# Patient Record
Sex: FEMALE | Race: BLACK OR AFRICAN AMERICAN | Employment: FULL TIME | URBAN - METROPOLITAN AREA
[De-identification: names, ages, dates, MRNs, and addresses within clinical notes are randomized per-mention and may not be internally consistent; named-entity substitution may affect disease eponyms.]

---

## 2023-05-01 ENCOUNTER — APPOINTMENT (OUTPATIENT)
Dept: ULTRASOUND IMAGING | Age: 33
End: 2023-05-01
Attending: EMERGENCY MEDICINE
Payer: COMMERCIAL

## 2023-05-01 ENCOUNTER — HOSPITAL ENCOUNTER (EMERGENCY)
Age: 33
Discharge: HOME | End: 2023-05-02
Attending: EMERGENCY MEDICINE
Payer: COMMERCIAL

## 2023-05-01 VITALS
DIASTOLIC BLOOD PRESSURE: 67 MMHG | SYSTOLIC BLOOD PRESSURE: 102 MMHG | WEIGHT: 189 LBS | HEIGHT: 63 IN | HEART RATE: 83 BPM | RESPIRATION RATE: 18 BRPM | OXYGEN SATURATION: 100 % | TEMPERATURE: 98.3 F | BODY MASS INDEX: 33.49 KG/M2

## 2023-05-01 DIAGNOSIS — O26.891 ABDOMINAL PAIN DURING PREGNANCY IN FIRST TRIMESTER: Primary | ICD-10-CM

## 2023-05-01 DIAGNOSIS — R10.9 ABDOMINAL PAIN DURING PREGNANCY IN FIRST TRIMESTER: Primary | ICD-10-CM

## 2023-05-01 LAB
ANION GAP SERPL CALC-SCNC: 3 MMOL/L (ref 5–15)
APPEARANCE UR: CLEAR
BACTERIA URNS QL MICRO: NEGATIVE /HPF
BASOPHILS # BLD: 0 K/UL (ref 0–0.1)
BASOPHILS NFR BLD: 0 % (ref 0–1)
BILIRUB UR QL: NEGATIVE
BUN SERPL-MCNC: 11 MG/DL (ref 6–20)
BUN/CREAT SERPL: 15 (ref 12–20)
CALCIUM SERPL-MCNC: 8.7 MG/DL (ref 8.5–10.1)
CHLORIDE SERPL-SCNC: 105 MMOL/L (ref 97–108)
CO2 SERPL-SCNC: 24 MMOL/L (ref 21–32)
COLOR UR: ABNORMAL
CREAT SERPL-MCNC: 0.71 MG/DL (ref 0.55–1.02)
DIFFERENTIAL METHOD BLD: ABNORMAL
EOSINOPHIL # BLD: 0 K/UL (ref 0–0.4)
EOSINOPHIL NFR BLD: 0 % (ref 0–7)
EPITH CASTS URNS QL MICRO: ABNORMAL /LPF
ERYTHROCYTE [DISTWIDTH] IN BLOOD BY AUTOMATED COUNT: 13.5 % (ref 11.5–14.5)
GLUCOSE SERPL-MCNC: 125 MG/DL (ref 65–100)
GLUCOSE UR STRIP.AUTO-MCNC: NEGATIVE MG/DL
HCG SERPL-ACNC: 424 MIU/ML (ref 0–6)
HCT VFR BLD AUTO: 35.1 % (ref 35–47)
HGB BLD-MCNC: 11.5 G/DL (ref 11.5–16)
HGB UR QL STRIP: ABNORMAL
IMM GRANULOCYTES # BLD AUTO: 0 K/UL (ref 0–0.04)
IMM GRANULOCYTES NFR BLD AUTO: 0 % (ref 0–0.5)
KETONES UR QL STRIP.AUTO: 15 MG/DL
LEUKOCYTE ESTERASE UR QL STRIP.AUTO: NEGATIVE
LYMPHOCYTES # BLD: 1.5 K/UL (ref 0.8–3.5)
LYMPHOCYTES NFR BLD: 14 % (ref 12–49)
MCH RBC QN AUTO: 30.7 PG (ref 26–34)
MCHC RBC AUTO-ENTMCNC: 32.8 G/DL (ref 30–36.5)
MCV RBC AUTO: 93.9 FL (ref 80–99)
MONOCYTES # BLD: 0.4 K/UL (ref 0–1)
MONOCYTES NFR BLD: 4 % (ref 5–13)
NEUTS SEG # BLD: 8.7 K/UL (ref 1.8–8)
NEUTS SEG NFR BLD: 82 % (ref 32–75)
NITRITE UR QL STRIP.AUTO: NEGATIVE
NRBC # BLD: 0 K/UL (ref 0–0.01)
NRBC BLD-RTO: 0 PER 100 WBC
PH UR STRIP: 5 (ref 5–8)
PLATELET # BLD AUTO: 294 K/UL (ref 150–400)
PMV BLD AUTO: 9.6 FL (ref 8.9–12.9)
POTASSIUM SERPL-SCNC: 4.2 MMOL/L (ref 3.5–5.1)
PROT UR STRIP-MCNC: NEGATIVE MG/DL
RBC # BLD AUTO: 3.74 M/UL (ref 3.8–5.2)
RBC #/AREA URNS HPF: ABNORMAL /HPF (ref 0–5)
SODIUM SERPL-SCNC: 132 MMOL/L (ref 136–145)
SP GR UR REFRACTOMETRY: 1.03 (ref 1–1.03)
UR CULT HOLD, URHOLD: NORMAL
UROBILINOGEN UR QL STRIP.AUTO: 0.2 EU/DL (ref 0.2–1)
WBC # BLD AUTO: 10.7 K/UL (ref 3.6–11)
WBC URNS QL MICRO: ABNORMAL /HPF (ref 0–4)

## 2023-05-01 PROCEDURE — 76817 TRANSVAGINAL US OBSTETRIC: CPT

## 2023-05-01 PROCEDURE — 74011250636 HC RX REV CODE- 250/636: Performed by: EMERGENCY MEDICINE

## 2023-05-01 PROCEDURE — 81001 URINALYSIS AUTO W/SCOPE: CPT

## 2023-05-01 PROCEDURE — 80048 BASIC METABOLIC PNL TOTAL CA: CPT

## 2023-05-01 PROCEDURE — 74011250637 HC RX REV CODE- 250/637: Performed by: EMERGENCY MEDICINE

## 2023-05-01 PROCEDURE — 36415 COLL VENOUS BLD VENIPUNCTURE: CPT

## 2023-05-01 PROCEDURE — 99284 EMERGENCY DEPT VISIT MOD MDM: CPT

## 2023-05-01 PROCEDURE — 86900 BLOOD TYPING SEROLOGIC ABO: CPT

## 2023-05-01 PROCEDURE — 84702 CHORIONIC GONADOTROPIN TEST: CPT

## 2023-05-01 PROCEDURE — 85025 COMPLETE CBC W/AUTO DIFF WBC: CPT

## 2023-05-01 RX ORDER — ACETAMINOPHEN 500 MG
1000 TABLET ORAL
Qty: 20 TABLET | Refills: 0 | OUTPATIENT
Start: 2023-05-01

## 2023-05-01 RX ORDER — ACETAMINOPHEN 500 MG
1000 TABLET ORAL ONCE
Status: COMPLETED | OUTPATIENT
Start: 2023-05-01 | End: 2023-05-01

## 2023-05-01 RX ORDER — ACETAMINOPHEN 500 MG
1000 TABLET ORAL
Qty: 20 TABLET | Refills: 0 | OUTPATIENT
Start: 2023-05-01 | End: 2023-05-01 | Stop reason: SDUPTHER

## 2023-05-01 RX ADMIN — ACETAMINOPHEN 1000 MG: 500 TABLET ORAL at 22:26

## 2023-05-01 RX ADMIN — SODIUM CHLORIDE 1000 ML: 9 INJECTION, SOLUTION INTRAVENOUS at 22:26

## 2023-05-02 LAB
ABO + RH BLD: NORMAL
BLOOD BANK CMNT PATIENT-IMP: NORMAL

## 2023-05-02 NOTE — ED PROVIDER NOTES
33F p/w 1d lower abd pain. Pt reports 1d mid lower abd cramping pain and about 1hr started to have small amount of bleeding. No F/C, cough, N/V/D or urinary symptoms. No dizziness, syncope or SOB. No chest pain. Took a home positive pregnancy test that was positive; scheduled upcoming OBGYN appointment. LMP 3/29/2023. Prior  x2. No other abd surgeries. No past medical history on file. No past surgical history on file. No family history on file. Social History     Socioeconomic History    Marital status: SINGLE     Spouse name: Not on file    Number of children: Not on file    Years of education: Not on file    Highest education level: Not on file   Occupational History    Not on file   Tobacco Use    Smoking status: Not on file    Smokeless tobacco: Not on file   Substance and Sexual Activity    Alcohol use: Not on file    Drug use: Not on file    Sexual activity: Not on file   Other Topics Concern    Not on file   Social History Narrative    Not on file     Social Determinants of Health     Financial Resource Strain: Not on file   Food Insecurity: Not on file   Transportation Needs: Not on file   Physical Activity: Not on file   Stress: Not on file   Social Connections: Not on file   Intimate Partner Violence: Not on file   Housing Stability: Not on file         ALLERGIES: Patient has no known allergies. Review of Systems   Constitutional:  Negative for chills, diaphoresis and fever. HENT:  Negative for facial swelling, mouth sores, nosebleeds, trouble swallowing and voice change. Eyes:  Negative for pain and visual disturbance. Respiratory:  Negative for apnea, cough, choking, shortness of breath, wheezing and stridor. Cardiovascular:  Negative for chest pain, palpitations and leg swelling. Gastrointestinal:  Positive for abdominal pain. Negative for abdominal distention, blood in stool, diarrhea, nausea and vomiting.    Genitourinary:  Positive for pelvic pain and vaginal bleeding. Negative for difficulty urinating, dysuria, flank pain and hematuria. Musculoskeletal:  Negative for joint swelling. Skin:  Negative for color change and rash. Allergic/Immunologic: Negative for immunocompromised state. Neurological:  Negative for dizziness, seizures, syncope, speech difficulty and light-headedness. Hematological:  Does not bruise/bleed easily. Psychiatric/Behavioral:  Negative for agitation and behavioral problems. Vitals:    05/01/23 2122   BP: 102/67   Pulse: 83   Resp: 18   Temp: 98.3 °F (36.8 °C)   SpO2: 100%   Weight: 85.7 kg (189 lb)   Height: 5' 3\" (1.6 m)            Physical Exam  Vitals and nursing note reviewed. Constitutional:       General: She is not in acute distress. Appearance: Normal appearance. She is not ill-appearing or toxic-appearing. HENT:      Head: Normocephalic and atraumatic. Right Ear: External ear normal.      Left Ear: External ear normal.      Nose: Nose normal.      Mouth/Throat:      Mouth: Mucous membranes are moist.      Pharynx: Oropharynx is clear. No oropharyngeal exudate or posterior oropharyngeal erythema. Eyes:      General: No scleral icterus. Extraocular Movements: Extraocular movements intact. Conjunctiva/sclera: Conjunctivae normal.      Pupils: Pupils are equal, round, and reactive to light. Cardiovascular:      Rate and Rhythm: Normal rate and regular rhythm. Pulses: Normal pulses. Heart sounds: Normal heart sounds. No murmur heard. No friction rub. No gallop. Pulmonary:      Effort: Pulmonary effort is normal. No respiratory distress. Breath sounds: Normal breath sounds. No stridor. No wheezing, rhonchi or rales. Abdominal:      General: There is no distension. Palpations: Abdomen is soft. Tenderness: There is no abdominal tenderness. There is no guarding or rebound. Musculoskeletal:         General: No tenderness or deformity. Normal range of motion.       Cervical back: Normal range of motion and neck supple. No rigidity. Right lower leg: No edema. Left lower leg: No edema. Skin:     General: Skin is warm. Capillary Refill: Capillary refill takes less than 2 seconds. Coloration: Skin is not jaundiced. Neurological:      General: No focal deficit present. Mental Status: She is alert. Cranial Nerves: No cranial nerve deficit. Sensory: No sensory deficit. Motor: No weakness. Coordination: Coordination normal.   Psychiatric:         Mood and Affect: Mood normal.         Behavior: Behavior normal.         Thought Content: Thought content normal.         Judgment: Judgment normal.      I personally reviewed and independently interpreted EKG, labs and imaging results. LABORATORY TESTS:  Admission on 05/01/2023, Discharged on 05/02/2023   Component Date Value Ref Range Status    WBC 05/01/2023 10.7  3.6 - 11.0 K/uL Final    RBC 05/01/2023 3.74 (L)  3.80 - 5.20 M/uL Final    HGB 05/01/2023 11.5  11.5 - 16.0 g/dL Final    HCT 05/01/2023 35.1  35.0 - 47.0 % Final    MCV 05/01/2023 93.9  80.0 - 99.0 FL Final    MCH 05/01/2023 30.7  26.0 - 34.0 PG Final    MCHC 05/01/2023 32.8  30.0 - 36.5 g/dL Final    RDW 05/01/2023 13.5  11.5 - 14.5 % Final    PLATELET 68/72/1192 036  150 - 400 K/uL Final    MPV 05/01/2023 9.6  8.9 - 12.9 FL Final    NRBC 05/01/2023 0.0  0  WBC Final    ABSOLUTE NRBC 05/01/2023 0.00  0.00 - 0.01 K/uL Final    NEUTROPHILS 05/01/2023 82 (H)  32 - 75 % Final    LYMPHOCYTES 05/01/2023 14  12 - 49 % Final    MONOCYTES 05/01/2023 4 (L)  5 - 13 % Final    EOSINOPHILS 05/01/2023 0  0 - 7 % Final    BASOPHILS 05/01/2023 0  0 - 1 % Final    IMMATURE GRANULOCYTES 05/01/2023 0  0.0 - 0.5 % Final    ABS. NEUTROPHILS 05/01/2023 8.7 (H)  1.8 - 8.0 K/UL Final    ABS. LYMPHOCYTES 05/01/2023 1.5  0.8 - 3.5 K/UL Final    ABS. MONOCYTES 05/01/2023 0.4  0.0 - 1.0 K/UL Final    ABS.  EOSINOPHILS 05/01/2023 0.0  0.0 - 0.4 K/UL Final ABS. BASOPHILS 05/01/2023 0.0  0.0 - 0.1 K/UL Final    ABS. IMM. GRANS. 05/01/2023 0.0  0.00 - 0.04 K/UL Final    DF 05/01/2023 AUTOMATED    Final    Sodium 05/01/2023 132 (L)  136 - 145 mmol/L Final    Potassium 05/01/2023 4.2  3.5 - 5.1 mmol/L Final    Chloride 05/01/2023 105  97 - 108 mmol/L Final    CO2 05/01/2023 24  21 - 32 mmol/L Final    Anion gap 05/01/2023 3 (L)  5 - 15 mmol/L Final    Glucose 05/01/2023 125 (H)  65 - 100 mg/dL Final    BUN 05/01/2023 11  6 - 20 MG/DL Final    Creatinine 05/01/2023 0.71  0.55 - 1.02 MG/DL Final    BUN/Creatinine ratio 05/01/2023 15  12 - 20   Final    eGFR 05/01/2023 >60  >60 ml/min/1.73m2 Final    Comment:      Pediatric calculator link: [email or web address] org/professionals/kdoqi/gfr_calculatorped       These results are not intended for use in patients <25years of age. eGFR results are calculated without a race factor using  the 2021 CKD-EPI equation. Careful clinical correlation is recommended, particularly when comparing to results calculated using previous equations. The CKD-EPI equation is less accurate in patients with extremes of muscle mass, extra-renal metabolism of creatinine, excessive creatine ingestion, or following therapy that affects renal tubular secretion. Calcium 05/01/2023 8.7  8.5 - 10.1 MG/DL Final    Beta HCG, QT 05/01/2023 424 (H)  0 - 6 MIU/ML Final    Comment:      Gestational Age hCG       mIU/mL (IU/L)       0.2-1 WEEK                  5-50  1-2 WEEKS                     2-3 WEEKS                   100-5,000  3-4 WEEKS                   500-10,000  4-5 WEEKS                   1,000-50,000  5-6 WEEKS                   10,000-100,000  6-8 WEEKS                   15,000-200,000  2-3 MONTHS                  10,000-100,000       The pregnancy tests performed at this institution are intended for use in diagnosing pregnancy only. They are not intended for use in screening for/monitoring of hCG as a tumor marker.  If a hCG producing tumor is in the differential diagnoses, recommend ordering test specific for hCG as a tumor marker. The results of hCG testing should be interpreted in conjunction with the clinical setting. Falsely elevated or decreased values may be seen in patients with human anti-mouse antibodies . Consistently elevated hCG levels may be due to heterophilic antibodies or to nonspecific protein binding. Elevated le                           vels of hCG can occur in other clinical settings such as abnormal physiologic states . Consider requesting hCG be quantitated by an alternative method as listed above, if the hCG results do not fit the clinical setting. ABO/Rh(D) 05/01/2023 O POSITIVE   Final    Comment 05/01/2023 SAMPLE NOT USABLE FOR CROSSMATCH   Final    Color 05/01/2023 YELLOW/STRAW    Final    Color Reference Range: Straw, Yellow or Dark Yellow    Appearance 05/01/2023 CLEAR  CLEAR   Final    Specific gravity 05/01/2023 1.028  1.003 - 1.030   Final    pH (UA) 05/01/2023 5.0  5.0 - 8.0   Final    Protein 05/01/2023 Negative  NEG mg/dL Final    Glucose 05/01/2023 Negative  NEG mg/dL Final    Ketone 05/01/2023 15 (A)  NEG mg/dL Final    Bilirubin 05/01/2023 Negative  NEG   Final    Blood 05/01/2023 MODERATE (A)  NEG   Final    Urobilinogen 05/01/2023 0.2  0.2 - 1.0 EU/dL Final    Nitrites 05/01/2023 Negative  NEG   Final    Leukocyte Esterase 05/01/2023 Negative  NEG   Final    WBC 05/01/2023 0-4  0 - 4 /hpf Final    RBC 05/01/2023 0-5  0 - 5 /hpf Final    Epithelial cells 05/01/2023 MODERATE (A)  FEW /lpf Final    Epithelial cell category consists of squamous cells and /or transitional urothelial cells. Renal tubular cells, if present, are separately identified as such. Bacteria 05/01/2023 Negative  NEG /hpf Final    Urine culture hold 05/01/2023 Urine on hold in Microbiology dept for 2 days.   If unpreserved urine is submitted, it cannot be used for addtional testing after 24 hours, recollection will be required. Final       IMAGING RESULTS:  US UTS TRANSVAGINAL OB   Final Result   No identified intrauterine gestation with pelvic and RIGHT   periovarian fluid. MEDICATIONS GIVEN:  Medications   sodium chloride 0.9 % bolus infusion 1,000 mL (0 mL IntraVENous IV Completed 5/2/23 0000)   acetaminophen (TYLENOL) tablet 1,000 mg (1,000 mg Oral Given 5/1/23 2226)       IMPRESSION:  1. Abdominal pain during pregnancy in first trimester        DISPOSITION:  - Discharge    Lacy , MD      Medical Decision Making  33F p/w 1d lower abd pain and vaginal bleeding. Pt well appearing, afebrile, hemodynamically stable. Mild suprapubic tenderness but no peritonitis. Labs ok including hgb. Rh+. . UA not suggestive of infection. US showing no IUP, right adnexal structure felt to be ovary w/ surrounding fluid by radiology. Long conversation w/ pt and explained ddx including early normal pregnancy vs spontaneous miscarriage less likely occult ectopic. Recommended serial HCG and repeat US in 2days. Urged pt to see OBGYN over next 2-3d. Explained bleeding precautions and recommended tylenol for pain. Patient given specific return precautions and explained signs/symptoms for which to come back to ED immediately but otherwise advised to f/u w/ PCP over next 2-3days. Amount and/or Complexity of Data Reviewed  Labs: ordered. Decision-making details documented in ED Course. Radiology: ordered and independent interpretation performed. Decision-making details documented in ED Course. Risk  OTC drugs.            Procedures

## 2023-05-02 NOTE — DISCHARGE INSTRUCTIONS
You were seen in the ER for abd pain in early pregnancy. Your HCG today was 420. Your US did not show a pregnancy inside or outside the uterus. It is very important that your HCG is repeated in 2 days. Return to the ER if you develop worsening bleeding more than 2 pads per hour.

## 2023-05-02 NOTE — ED TRIAGE NOTES
Patient states had a positive home pregnancy test yesterday. This morning began with suprapubic abdominal cramping. Denies vaginal bleeding or discharge. LMP 3/29. G3, P2, A0.

## 2024-11-27 LAB
C. TRACHOMATIS, EXTERNAL RESULT: NEGATIVE
HEP B, EXTERNAL RESULT: NEGATIVE
HEPATITIS C ANTIBODY, EXTERNAL RESULT: NON REACTIVE
HIV, EXTERNAL RESULT: NON REACTIVE
N. GONORRHOEAE, EXTERNAL RESULT: NEGATIVE
RUBELLA TITER, EXTERNAL RESULT: NORMAL
T. PALLIDUM (SYPHILIS) ANTIBODY, EXTERNAL RESULT: NON REACTIVE

## 2025-05-20 ENCOUNTER — HOSPITAL ENCOUNTER (EMERGENCY)
Facility: HOSPITAL | Age: 35
Discharge: HOME | End: 2025-05-20

## 2025-05-20 VITALS
DIASTOLIC BLOOD PRESSURE: 82 MMHG | HEART RATE: 81 BPM | SYSTOLIC BLOOD PRESSURE: 117 MMHG | RESPIRATION RATE: 16 BRPM | TEMPERATURE: 98.4 F

## 2025-05-20 PROCEDURE — 99282 EMERGENCY DEPT VISIT SF MDM: CPT

## 2025-05-20 PROCEDURE — 4500000002 HC ER NO CHARGE

## 2025-05-20 NOTE — PROGRESS NOTES
1923~  Patient arrived from home after not feeling much movement at all today since around 3 am.  She spoke to the nurse at [facility] and was instructed to come in to be monitored.    1936~  Dr Marmolejo in to see patient.

## 2025-05-20 NOTE — ED PROVIDER NOTES
36 y/o  @ 33 weeks presents with decreased fetal movement. Usually feels the baby move from 1 am -3 am. But has not felt the baby move since this morning.  Since she has been here on the L & D, has been feeling the baby move now  No LOF, no vaginal bleeding  PNC at [facility]     x 2           No chief complaint on file.      No past medical history on file.    No past surgical history on file.      No family history on file.    Social History     Socioeconomic History    Marital status: Single     Spouse name: Not on file    Number of children: Not on file    Years of education: Not on file    Highest education level: Not on file   Occupational History    Not on file   Tobacco Use    Smoking status: Not on file    Smokeless tobacco: Not on file   Substance and Sexual Activity    Alcohol use: Not on file    Drug use: Not on file    Sexual activity: Not on file   Other Topics Concern    Not on file   Social History Narrative    Not on file     Social Drivers of Health     Financial Resource Strain: Not on file   Food Insecurity: Not on file   Transportation Needs: Not on file   Physical Activity: Not on file   Stress: Not on file   Social Connections: Not on file   Intimate Partner Violence: Not on file   Housing Stability: Not on file         ALLERGIES: Patient has no allergy information on record.    Review of Systems   All other systems reviewed and are negative.      Vitals:    25 1937   BP: 117/82   Pulse: 81   Temp: 98.4 °F (36.9 °C)   TempSrc: Oral            Physical Exam  Vitals and nursing note reviewed. Exam conducted with a chaperone present.   Constitutional:       Appearance: She is obese.   HENT:      Nose: Nose normal.      Mouth/Throat:      Pharynx: Oropharynx is clear.   Eyes:      Extraocular Movements: Extraocular movements intact.      Pupils: Pupils are equal, round, and reactive to light.   Cardiovascular:      Rate and Rhythm: Normal rate and regular rhythm.      Pulses: Normal pulses.

## 2025-05-20 NOTE — PROGRESS NOTES
1940: Bedside and Verbal shift change report given to JEANETH Guidry (oncoming nurse) by KACIE Sexton (offgoing nurse). Report included the following information Nurse Handoff Report.    Per Dr Marmolejo, ok to DC following reactive NST      2015: reviewed strip w/ Dr Marmolejo. Patient reports feeling fetal movement, this RN able to palpate movement while adjusting monitors. ok to DC home per Dr Marmolejo    2020: Discussed DC instructions with patient- reviewed reason to call OB or return to [facility] (ctx, bleeding, decreased fetal movement, LOF, signs of Pre-E, etc). Patient verbalized understanding, denies questions at this time    2021: patient left ambulatory with family member

## 2025-06-10 ENCOUNTER — HOSPITAL ENCOUNTER (EMERGENCY)
Facility: HOSPITAL | Age: 35
Discharge: HOME | End: 2025-06-10
Payer: COMMERCIAL

## 2025-06-10 VITALS
HEART RATE: 80 BPM | SYSTOLIC BLOOD PRESSURE: 110 MMHG | DIASTOLIC BLOOD PRESSURE: 72 MMHG | RESPIRATION RATE: 16 BRPM | TEMPERATURE: 98 F

## 2025-06-10 PROCEDURE — 4500000002 HC ER NO CHARGE

## 2025-06-10 PROCEDURE — 99284 EMERGENCY DEPT VISIT MOD MDM: CPT

## 2025-06-10 PROCEDURE — 6370000000 HC RX 637 (ALT 250 FOR IP): Performed by: STUDENT IN AN ORGANIZED HEALTH CARE EDUCATION/TRAINING PROGRAM

## 2025-06-10 RX ORDER — METOCLOPRAMIDE 10 MG/1
10 TABLET ORAL ONCE
Status: COMPLETED | OUTPATIENT
Start: 2025-06-10 | End: 2025-06-10

## 2025-06-10 RX ORDER — ASPIRIN 81 MG/1
81 TABLET, CHEWABLE ORAL DAILY
COMMUNITY

## 2025-06-10 RX ORDER — ACETAMINOPHEN 500 MG
1000 TABLET ORAL ONCE
Status: COMPLETED | OUTPATIENT
Start: 2025-06-10 | End: 2025-06-10

## 2025-06-10 RX ADMIN — METOCLOPRAMIDE 10 MG: 10 TABLET ORAL at 18:24

## 2025-06-10 RX ADMIN — ACETAMINOPHEN 1000 MG: 500 TABLET ORAL at 18:24

## 2025-06-10 ASSESSMENT — PAIN SCALES - GENERAL: PAINLEVEL_OUTOF10: 6

## 2025-06-10 ASSESSMENT — PAIN DESCRIPTION - LOCATION: LOCATION: HEAD

## 2025-06-10 NOTE — DISCHARGE INSTRUCTIONS
Headache: Care Instructions  Overview     Headaches have many possible causes. Most headaches aren't a sign of a more serious problem, and they will get better on their own. Home treatment may help you feel better faster.  The doctor has checked you carefully, but problems can develop later. If you notice any problems or new symptoms, get medical treatment right away.  Follow-up care is a key part of your treatment and safety. Be sure to make and go to all appointments, and call your doctor if you are having problems. It's also a good idea to know your test results and keep a list of the medicines you take.  How can you care for yourself at home?  Rest in a quiet, dark room until your headache is gone. Close your eyes and try to relax or go to sleep. Don't watch TV or read.  Put a cold, moist cloth or cold pack on the painful area for 10 to 20 minutes at a time. Put a thin cloth between the cold pack and your skin.  Use a warm, moist towel or a heating pad set on low to relax tight shoulder and neck muscles.  Have someone gently massage your neck and shoulders.  Take pain medicines exactly as directed.  If the doctor gave you a prescription medicine for pain, take it as prescribed.  If you are not taking a prescription pain medicine, ask your doctor if you can take an over-the-counter medicine.  Do not ignore new symptoms that occur with a headache, such as a fever, weakness or numbness, vision changes, or confusion. These may be signs of a more serious problem.  To prevent headaches  Keep a headache diary so you can figure out what triggers your headaches. Avoiding triggers may help you prevent headaches. Record when each headache began, how long it lasted, and what the pain was like (throbbing, aching, stabbing, or dull). Write down any other symptoms you had with the headache, such as nausea, flashing lights or dark spots, or sensitivity to bright light or loud noise. Note if the headache occurred near your  is disrupted by your headaches. For example, you often miss work, school, or other activities.     You do not get better as expected.   Where can you learn more?  Go to https://www.[employer].net/patientEd and enter M271 to learn more about \"Headache: Care Instructions.\"  Current as of: December 3, 2024  Content Version: 14.5  © 7822-9196 [employer].   Care instructions adapted under license by [employer]. If you have questions about a medical condition or this instruction, always ask your healthcare professional. [employer] disclaims any warranty or liability for your use of this information.  Pregnancy Precautions: Care Instructions  Overview     There is no sure way to prevent labor before your due date ( labor) or to prevent most other pregnancy problems. But there are things you can do to increase your chances of a healthy pregnancy. Go to your appointments, follow your doctor's advice, and take good care of yourself. Eat healthy foods, and exercise (if your doctor agrees). And make sure to drink plenty of water.  Follow-up care is a key part of your treatment and safety. Be sure to make and go to all appointments, and call your doctor if you are having problems. It's also a good idea to know your test results and keep a list of the medicines you take.  How can you care for yourself at home?  Make sure you go to your prenatal appointments. At each visit, your doctor will check your blood pressure and weight. Your doctor will also listen for a fetal heartbeat and measure the size of the uterus.  Drink plenty of fluids. Dehydration can cause contractions. If you have kidney, heart, or liver disease and have to limit fluids, talk with your doctor before you increase the amount of fluids you drink.  Tell your doctor right away if you notice any symptoms of an infection, such as:  Burning when you urinate.  A frequent need to urinate without being able to pass much urine.  A

## 2025-06-10 NOTE — ED TRIAGE NOTES
6/10/2025  5:42 PM Patient arrived to [facility] with complaints of headache that has lasted all day as well as swelling that she noticed this morning. Patient reports positive fetal movement and denies any bleeding or loss of fluid.    1800: Dr Ruiz notified of patient's arrival. Will be out shortly to see patient.   1832: Dr Ruiz at bedside to see patient and discuss plan of care.   1723: Dr Ruiz called, patient reports headache is better. Patient may be discharged home at this time.    1931: Patient discharged home, undelivered. Discharge instructions reviewed with patient and daughter and patient given copy of d/c instructions to take home. No further questions or concerns at this time. Has scheduled OB appt Friday.

## 2025-06-11 NOTE — H&P
Triage Evaluation    Name: Annita Ortiz MRN: [id number]  SSN: [id number]    YOB: 1990  Age: 35 y.o.  Sex: female      Subjective:     Chief Complaint:  Pregnancy and Headache    History of Present Illness: Ms. Ortiz is a 35 y.o.  female with an estimated gestational age of 36w3d with Estimated Date of Delivery: 25. The patient presented to the hospital after work for a headache and feet swelling. She has not tried anything to alleviate the headache, no exacerbating factors. She does not drink caffeine. Also reports some spots in her vision with changing positions. Has had 2-3 cups of water today. Denies chest pain, SOB or RUQ pain. 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 She does not elevated her feet throughout the day.     This pregnancy has been complicated by obesity, AMA, history of preeclampsia, multiparity. Her blood pressures in this pregnancy have all been normotensive. Please see prenatal records which have also been sent to Labor and Delivery and added to [facility] for details.    OB History    Para Term  AB Living   3 0 0   2   SAB IAB Ectopic Molar Multiple Live Births        2      # Outcome Date GA Lbr Tyler/2nd Weight Sex Type Anes PTL Lv   3 Current            2       CS-LTranv      1       CS-LTranv        History reviewed. No pertinent past medical history.  Past Surgical History:   Procedure Laterality Date     SECTION      ,      Social History     Occupational History    Not on file   Tobacco Use    Smoking status: Never    Smokeless tobacco: Never   Substance and Sexual Activity    Alcohol use: Not Currently    Drug use: Never    Sexual activity: Not on file      History reviewed. No pertinent family history.    Allergies   Allergen Reactions    Latex      Prior to Admission medications    Medication Sig Start Date End Date Taking? Authorizing Provider   aspirin 81 MG chewable tablet Take 1 tablet

## 2025-06-15 LAB — GBS, EXTERNAL RESULT: NEGATIVE

## 2025-06-27 ENCOUNTER — HOSPITAL ENCOUNTER (OUTPATIENT)
Facility: HOSPITAL | Age: 35
Discharge: HOME | End: 2025-06-27
Attending: STUDENT IN AN ORGANIZED HEALTH CARE EDUCATION/TRAINING PROGRAM | Admitting: STUDENT IN AN ORGANIZED HEALTH CARE EDUCATION/TRAINING PROGRAM
Payer: COMMERCIAL

## 2025-06-27 VITALS
SYSTOLIC BLOOD PRESSURE: 121 MMHG | TEMPERATURE: 98.1 F | DIASTOLIC BLOOD PRESSURE: 77 MMHG | OXYGEN SATURATION: 99 % | RESPIRATION RATE: 16 BRPM | HEART RATE: 91 BPM

## 2025-06-27 LAB
ABO + RH BLD: NORMAL
BASOPHILS # BLD: 0.04 K/UL (ref 0–0.1)
BASOPHILS NFR BLD: 0.5 % (ref 0–1)
BLOOD GROUP ANTIBODIES SERPL: NORMAL
DIFFERENTIAL METHOD BLD: ABNORMAL
EOSINOPHIL # BLD: 0.05 K/UL (ref 0–0.4)
EOSINOPHIL NFR BLD: 0.6 % (ref 0–7)
ERYTHROCYTE [DISTWIDTH] IN BLOOD BY AUTOMATED COUNT: 15.4 % (ref 11.5–14.5)
HCT VFR BLD AUTO: 32.1 % (ref 35–47)
HGB BLD-MCNC: 10.8 G/DL (ref 11.5–16)
IMM GRANULOCYTES # BLD AUTO: 0.09 K/UL (ref 0–0.04)
IMM GRANULOCYTES NFR BLD AUTO: 1.1 % (ref 0–0.5)
LYMPHOCYTES # BLD: 1.87 K/UL (ref 0.8–3.5)
LYMPHOCYTES NFR BLD: 23.2 % (ref 12–49)
MCH RBC QN AUTO: 28.8 PG (ref 26–34)
MCHC RBC AUTO-ENTMCNC: 33.6 G/DL (ref 30–36.5)
MCV RBC AUTO: 85.6 FL (ref 80–99)
MONOCYTES # BLD: 0.64 K/UL (ref 0–1)
MONOCYTES NFR BLD: 7.9 % (ref 5–13)
NEUTS SEG # BLD: 5.37 K/UL (ref 1.8–8)
NEUTS SEG NFR BLD: 66.7 % (ref 32–75)
NRBC # BLD: 0.02 K/UL (ref 0–0.01)
NRBC BLD-RTO: 0.2 PER 100 WBC
PLATELET # BLD AUTO: 237 K/UL (ref 150–400)
PMV BLD AUTO: 10.9 FL (ref 8.9–12.9)
RBC # BLD AUTO: 3.75 M/UL (ref 3.8–5.2)
RPR SER QL: NONREACTIVE
SPECIMEN EXP DATE BLD: NORMAL
WBC # BLD AUTO: 8.1 K/UL (ref 3.6–11)

## 2025-06-27 PROCEDURE — 86850 RBC ANTIBODY SCREEN: CPT

## 2025-06-27 PROCEDURE — 86901 BLOOD TYPING SEROLOGIC RH(D): CPT

## 2025-06-27 PROCEDURE — 86592 SYPHILIS TEST NON-TREP QUAL: CPT

## 2025-06-27 PROCEDURE — 85025 COMPLETE CBC W/AUTO DIFF WBC: CPT

## 2025-06-27 PROCEDURE — 86900 BLOOD TYPING SEROLOGIC ABO: CPT

## 2025-06-27 RX ORDER — VALACYCLOVIR HYDROCHLORIDE 500 MG/1
500 TABLET, FILM COATED ORAL 2 TIMES DAILY
COMMUNITY

## 2025-06-27 NOTE — PROGRESS NOTES
0915-Patient here for Pre-Admission Testing (PAT) for scheduled  section. PAT packet reviewed with the patient. Labs drawn and sent. SALUD wipes and preventing surgical site infection education given to the patient. Education also provided to be NPO after 0400 and to arrive for scheduled procedure at 0800 on . Patient verbalizes understanding and sent home with PAT packet for further review.

## 2025-06-30 LAB
ABO + RH BLD: NORMAL
BLOOD GROUP ANTIBODIES SERPL: NORMAL
SPECIMEN EXP DATE BLD: NORMAL